# Patient Record
Sex: FEMALE | Race: WHITE | URBAN - METROPOLITAN AREA
[De-identification: names, ages, dates, MRNs, and addresses within clinical notes are randomized per-mention and may not be internally consistent; named-entity substitution may affect disease eponyms.]

---

## 2018-04-18 ENCOUNTER — TRANSFERRED RECORDS (OUTPATIENT)
Dept: HEALTH INFORMATION MANAGEMENT | Facility: CLINIC | Age: 23
End: 2018-04-18

## 2019-04-02 ENCOUNTER — TRANSFERRED RECORDS (OUTPATIENT)
Dept: HEALTH INFORMATION MANAGEMENT | Facility: CLINIC | Age: 24
End: 2019-04-02

## 2019-04-10 DIAGNOSIS — M95.0 NASAL DEFORMITY: Primary | ICD-10-CM

## 2019-04-10 DIAGNOSIS — J34.89 NASAL OBSTRUCTION: ICD-10-CM

## 2019-04-10 DIAGNOSIS — J34.2 DEVIATED NASAL SEPTUM: ICD-10-CM

## 2019-08-05 ENCOUNTER — TRANSFERRED RECORDS (OUTPATIENT)
Dept: HEALTH INFORMATION MANAGEMENT | Facility: CLINIC | Age: 24
End: 2019-08-05

## 2019-08-12 ENCOUNTER — OFFICE VISIT (OUTPATIENT)
Dept: PLASTIC SURGERY | Facility: CLINIC | Age: 24
End: 2019-08-12

## 2019-08-12 DIAGNOSIS — Z98.890 POSTOPERATIVE STATE: Primary | ICD-10-CM

## 2019-08-12 NOTE — NURSING NOTE
Chief Complaint   Patient presents with     Post-op Visit     septorhinoplasty     Patient to follow up in one month with Dr. Reyna.  Patient is very pleased already. Gloria Perez, Patient Coordinator

## 2019-08-12 NOTE — LETTER
2019       RE: Kristi Cisneros  [address] [address]     Dear Colleague,    Thank you for referring your patient, Kristi Cisneros, to the [facility]. Please see a copy of my visit note below.    Service Date: 2019      REASON FOR VISIT:  Kristi is in today in follow up.        PHYSICAL EXAMINATION:  Her cast and stents are off.  She has done the most amazing job of putting Aquaphor in her nose that I have ever seen.  The nose looks great.  It breathes well.  Sutures are trimmed along the skin margin.  The nose is straight.       ASSESSMENT:  All-in-all, things look excellent at this point.  She likes the improvement in the profile.       PLAN:  I will see her back in one month.         GEORGES MENDEZ MD       D: 2019   T: 2019   MT: ms      Name:     KRISTI CISNEROS   MRN:      [id number]        Account:      [id number]   :      1995           Service Date: 2019      Document: [id number]

## 2019-08-12 NOTE — LETTER
August 12, 2019  Re: Kristi KLINE Samirkate  1995    Dear Dr. Graff,    Thank you so much for referring Kristi Carmichael to the [facility]. I had the pleasure of visiting with Kristi today.     Attached you will find a copy of my note. Please feel free to reach out to me with any questions, [phone].       This office note has been dictated.      Your trust in our practice and care is much appreciated.    Sincerely,  GEORGES MENDEZ MD

## 2019-08-13 NOTE — PROGRESS NOTES
Service Date: 2019      REASON FOR VISIT:  Ayo is in today in follow up.        PHYSICAL EXAMINATION:  Her cast and stents are off.  She has done the most amazing job of putting Aquaphor in her nose that I have ever seen.  The nose looks great.  It breathes well.  Sutures are trimmed along the skin margin.  The nose is straight.       ASSESSMENT:  All-in-all, things look excellent at this point.  She likes the improvement in the profile.       PLAN:  I will see her back in one month.         GEORGES MENDEZ MD             D: 2019   T: 2019   MT: ms      Name:     AYO CISNEROS   MRN:      [id number]        Account:      [id number]   :      1995           Service Date: 2019      Document: [id number]

## 2019-09-11 ENCOUNTER — OFFICE VISIT (OUTPATIENT)
Dept: PLASTIC SURGERY | Facility: CLINIC | Age: 24
End: 2019-09-11

## 2019-09-11 DIAGNOSIS — Z98.890 POSTOPERATIVE STATE: Primary | ICD-10-CM

## 2019-09-11 NOTE — LETTER
9/11/2019      RE: Kristi Carmichael  [address] [address]         This office note has been dictated.    GEORGES MENDEZ MD

## 2019-09-11 NOTE — LETTER
September 11, 2019  Re: Kristi KLINE Samirkate  1995    Dear Dr. Graff,    Thank you so much for referring Kristi Carmichael to the [facility]. I had the pleasure of visiting with Kristi today.     Attached you will find a copy of my note. Please feel free to reach out to me with any questions, [phone].       This office note has been dictated.      Your trust in our practice and care is much appreciated.    Sincerely,  GEORGES MENDEZ MD

## 2019-09-11 NOTE — LETTER
2019      RE: Kristi Cisneros  [address] [facility]  [address] [place] [id number]     Service Date: 2019      REASON FOR VISIT: Kristi is in to see us following her rhinoplasty.      PHYSICAL EXAMINATION:  It looks terrific.  She does not need steroid injections.  I cannot ask that this be any better at this point.  She has an upper respiratory infection, but the surgery is healing beautifully and the airway looks excellent.       ASSESSMENT AND PLAN:  She will be back to see me in several months.  We will get photographs at that time.  We will talk about using her photos for promotional and teaching purposes.         GEORGES MENDEZ MD         D: 2019   T: 2019   MT: ms      Name:     KRISTI CISNEROS   MRN:      [id number]        Account:      [id number]   :      1995           Service Date: 2019      Document: [id number]      Service Date: 2019         Bowen Hernandez M.D.   [facility]   [address] [place], [place] [id number]         Dear Dr. Hernandez,      I had the opportunity to see Kristi back after her rhinoplasty.  She has a terrific airway and a very attractive nose.  I am very flattered that you had her see me.      Best Regards,         GEORGES MENDEZ MD             D: 2019   T: 2019   MT: ms      Name:     KRISTI CISNEROS   MRN:      [id number]        Account:      [id number]   :      1995           Service Date: 2019      Document: [id number]

## 2019-09-13 NOTE — PROGRESS NOTES
Service Date: 2019         Bowen Hernandez M.D.   [facility]   [address] [facility]   [address] [place] [address]         Dear Dr. Hernandez,      I had the opportunity to see Ayo back after her rhinoplasty.  She has a terrific airway and a very attractive nose.  I am very flattered that you had her see me.      Best Regards,         GEORGES MENDEZ MD             D: 2019   T: 2019   MT: ms      Name:     AYO CISNEROS   MRN:      [id number]        Account:      [id number]   :      1995           Service Date: 2019      Document: [id number]

## 2019-09-13 NOTE — PROGRESS NOTES
Service Date: 2019      REASON FOR VISIT: Ayo is in to see us following her rhinoplasty.      PHYSICAL EXAMINATION:  It looks terrific.  She does not need steroid injections.  I cannot ask that this be any better at this point.  She has an upper respiratory infection, but the surgery is healing beautifully and the airway looks excellent.       ASSESSMENT AND PLAN:  She will be back to see me in several months.  We will get photographs at that time.  We will talk about using her photos for promotional and teaching purposes.         GEORGES MENDEZ MD             D: 2019   T: 2019   MT: ms      Name:     AYO CISNEROS   MRN:      [id number]        Account:      [id number]   :      1995           Service Date: 2019      Document: [id number]

## 2019-10-16 ENCOUNTER — OFFICE VISIT (OUTPATIENT)
Dept: PLASTIC SURGERY | Facility: CLINIC | Age: 24
End: 2019-10-16

## 2019-10-16 DIAGNOSIS — Z98.890 POSTOPERATIVE STATE: Primary | ICD-10-CM

## 2019-10-16 NOTE — LETTER
Date:October 17, 2019      Patient was self referred, no letter generated. Do not send.        [facility] Health Information

## 2019-10-16 NOTE — LETTER
October 16, 2019  Re: Kristi KLINE Samirkate  1995    Dear Dr. Graff,    Thank you so much for referring Kristi Carmichael to the [facility]. I had the pleasure of visiting with Kristi today.     Attached you will find a copy of my note. Please feel free to reach out to me with any questions, [phone].       This office note has been dictated.      Your trust in our practice and care is much appreciated.    Sincerely,  GEORGES MENDEZ MD
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
## 2020-03-05 ENCOUNTER — OFFICE VISIT (OUTPATIENT)
Dept: PLASTIC SURGERY | Facility: CLINIC | Age: 25
End: 2020-03-05
Payer: COMMERCIAL

## 2020-03-05 DIAGNOSIS — Z98.890 POSTOPERATIVE STATE: Primary | ICD-10-CM

## 2020-03-05 NOTE — LETTER
3/5/2020       RE: Ayo Cisneros  [address] [place], [place] [id number]     Dear Colleague,    Thank you for referring your patient, Ayo Cisneros, to the [facility] [facility] at [facility]. Please see a copy of my visit note below.    Service Date: 2020      HISTORY OF PRESENT ILLNESS:  Ayo is in today following her rhinoplasty.        PHYSICAL EXAMINATION:  Things look good.  The supratip is just slightly full.        PROCEDURE: I injected 0.10 cc of 10 mg/cc Kenalog in the supratip area.        ASSESSMENT AND PLAN:  She is very happy as are we.  She will be back to see us in several months.         GEORGES MENDEZ MD       D: 2020   T: 2020   MT: ms      Name:     AYO CISNEROS   MRN:      [id number]        Account:      [id number]   :      1995           Service Date: 2020      Document: [id number]

## 2020-03-05 NOTE — LETTER
3/5/2020       RE: Kristi Carmichael  [facility] [address]     Dear Colleague,    Thank you for referring your patient, Kristi Carmichael, to the [facility] at [facility]. Please see a copy of my visit note below.    This office note has been dictated.     Again, thank you for allowing me to participate in the care of your patient.      Sincerely,    GEORGES MENDZE MD

## 2020-03-05 NOTE — LETTER
3/5/2020       RE: Ayo Cisneros  [address] [place], [place] [id number]     Dear Colleague,    Thank you for referring your patient, Ayo Cisneros, to the [facility] at [facility]. Please see a copy of my visit note below.    Service Date: 2020      HISTORY OF PRESENT ILLNESS:  Ayo is in today following her rhinoplasty.        PHYSICAL EXAMINATION:  Things look good.  The supratip is just slightly full.        PROCEDURE: I injected 0.10 cc of 10 mg/cc Kenalog in the supratip area.        ASSESSMENT AND PLAN:  She is very happy as are we.  She will be back to see us in several months.      GEORGES MENDEZ MD       D: 2020   T: 2020   MT: ms      Name:     AYO CISNEROS   MRN:      [id number]        Account:      [id number]   :      1995           Service Date: 2020      Document: [id number]

## 2020-03-05 NOTE — LETTER
March 5, 2020  Re: Kristi KLINE Samirkate  1995    Dear Dr. Graff,    Thank you so much for referring Kristi Carmichael to the [facility]. I had the pleasure of visiting with Kristi today.     Attached you will find a copy of my note. Please feel free to reach out to me with any questions, [phone].     This office note has been dictated.       Your trust in our practice and care is much appreciated.    Sincerely,  GEORGES MENDEZ MD

## 2020-03-06 NOTE — PROGRESS NOTES
Service Date: 2020      HISTORY OF PRESENT ILLNESS:  Ayo is in today following her rhinoplasty.        PHYSICAL EXAMINATION:  Things look good.  The supratip is just slightly full.        PROCEDURE: I injected 0.10 cc of 10 mg/cc Kenalog in the supratip area.        ASSESSMENT AND PLAN:  She is very happy as are we.  She will be back to see us in several months.         GEORGES MENDEZ MD             D: 2020   T: 2020   MT: ms      Name:     AYO CISNEROS   MRN:      [id number]        Account:      [id number]   :      1995           Service Date: 2020      Document: [id number]

## 2020-03-18 NOTE — NURSING NOTE
Chief Complaint   Patient presents with     Post-op Visit     Obtained photo documentation.  Patient very happy this far and will follow up in 3 months with Dr. Reyna. Gloria Perez, Patient Coordinator

## 2020-07-08 ENCOUNTER — OFFICE VISIT (OUTPATIENT)
Dept: PLASTIC SURGERY | Facility: CLINIC | Age: 25
End: 2020-07-08

## 2020-07-08 DIAGNOSIS — Z98.890 POSTOPERATIVE STATE: Primary | ICD-10-CM

## 2020-07-08 NOTE — LETTER
Date:July 20, 2020      Patient was self referred, no letter generated. Do not send.        [facility] Health Information

## 2020-07-08 NOTE — LETTER
2020       RE: Kristi Cisneros  [address] [id number]     Dear Colleague,    Thank you for referring your patient, Kristi Cisneros, to the [facility] at [facility]. Please see a copy of my visit note below.    This office note has been dictated.     Service Date: 2020      HISTORY OF PRESENT ILLNESS:  Kristi is in today.  She is delighted with the results.  She is carrying more sun exposure than I would like her to have.        PHYSICAL EXAMINATION:  She breathes well.  Her nose looks good.        PROCEDURE:  I did inject a small amount of steroid along the right alar rim and in the supratip area, but less than 0.10 cc was used.        ASSESSMENT AND PLAN:  I am going to have her see us as-needed.         GEORGES MENDEZ MD             D: 2020   T: 2020   MT: ms      Name:     KRISTI CISNEROS   MRN:      [id number]        Account:      [id number]   :      1995           Service Date: 2020      Document: [id number]       Again, thank you for allowing me to participate in the care of your patient.      Sincerely,    GEORGES MENDEZ MD

## 2020-07-08 NOTE — LETTER
2020  Re: Ayo Cisneros  1995    Dear Dr. Graff,    Thank you so much for referring Ayo Cisneros to the [facility]. I had the pleasure of visiting with Ayo today.     Attached you will find a copy of my note. Please feel free to reach out to me with any questions, [phone].     This office note has been dictated.     Service Date: 2020      HISTORY OF PRESENT ILLNESS:  Ayo is in today.  She is delighted with the results.  She is carrying more sun exposure than I would like her to have.        PHYSICAL EXAMINATION:  She breathes well.  Her nose looks good.        PROCEDURE:  I did inject a small amount of steroid along the right alar rim and in the supratip area, but less than 0.10 cc was used.        ASSESSMENT AND PLAN:  I am going to have her see us as-needed.         GEORGES MENDEZ MD             D: 2020   T: 2020   MT: ms      Name:     AYO CISNEROS   MRN:      [id number]        Account:      [id number]   :      1995           Service Date: 2020      Document: [id number]         Your trust in our practice and care is much appreciated.    Sincerely,  GEORGES MENDEZ MD

## 2020-07-08 NOTE — LETTER
Date:July 20, 2020      Patient was self referred, no letter generated. Do not send.        [facility] Health Information

## 2020-07-09 NOTE — PROGRESS NOTES
Service Date: 2020      HISTORY OF PRESENT ILLNESS:  Ayo is in today.  She is delighted with the results.  She is carrying more sun exposure than I would like her to have.        PHYSICAL EXAMINATION:  She breathes well.  Her nose looks good.        PROCEDURE:  I did inject a small amount of steroid along the right alar rim and in the supratip area, but less than 0.10 cc was used.        ASSESSMENT AND PLAN:  I am going to have her see us as-needed.         GEORGES MENDEZ MD             D: 2020   T: 2020   MT: ms      Name:     AYO ICSNEROS   MRN:      [id number]        Account:      [id number]   :      1995           Service Date: 2020      Document: [id number]

## 2020-07-13 NOTE — NURSING NOTE
Chief Complaint   Patient presents with     Post-op Visit     septorhinoplasty     Obtained photo documentation.  Patient is very pleased and will check back with us in a few months for final post procedure photos. Gloria Perez, Patient Coordinator